# Patient Record
Sex: FEMALE | Race: AMERICAN INDIAN OR ALASKA NATIVE
[De-identification: names, ages, dates, MRNs, and addresses within clinical notes are randomized per-mention and may not be internally consistent; named-entity substitution may affect disease eponyms.]

---

## 2022-07-27 ENCOUNTER — HOSPITAL ENCOUNTER (INPATIENT)
Dept: HOSPITAL 5 - TRG | Age: 27
LOS: 4 days | Discharge: HOME | End: 2022-07-31
Attending: OBSTETRICS & GYNECOLOGY | Admitting: OBSTETRICS & GYNECOLOGY
Payer: MEDICAID

## 2022-07-27 DIAGNOSIS — Z20.822: ICD-10-CM

## 2022-07-27 DIAGNOSIS — A60.00: ICD-10-CM

## 2022-07-27 DIAGNOSIS — Z3A.37: ICD-10-CM

## 2022-07-27 DIAGNOSIS — E66.01: ICD-10-CM

## 2022-07-27 LAB
HCT VFR BLD CALC: 31.8 % (ref 30.3–42.9)
HGB BLD-MCNC: 10 GM/DL (ref 10.1–14.3)
MCHC RBC AUTO-ENTMCNC: 31 % (ref 30–34)
MCV RBC AUTO: 61 FL (ref 79–97)
PLATELET # BLD: 190 K/MM3 (ref 140–440)
RBC # BLD AUTO: 5.21 M/MM3 (ref 3.65–5.03)

## 2022-07-27 PROCEDURE — 86901 BLOOD TYPING SEROLOGIC RH(D): CPT

## 2022-07-27 PROCEDURE — 86850 RBC ANTIBODY SCREEN: CPT

## 2022-07-27 PROCEDURE — 85014 HEMATOCRIT: CPT

## 2022-07-27 PROCEDURE — 86900 BLOOD TYPING SEROLOGIC ABO: CPT

## 2022-07-27 PROCEDURE — 85018 HEMOGLOBIN: CPT

## 2022-07-27 PROCEDURE — 59200 INSERT CERVICAL DILATOR: CPT

## 2022-07-27 PROCEDURE — U0003 INFECTIOUS AGENT DETECTION BY NUCLEIC ACID (DNA OR RNA); SEVERE ACUTE RESPIRATORY SYNDROME CORONAVIRUS 2 (SARS-COV-2) (CORONAVIRUS DISEASE [COVID-19]), AMPLIFIED PROBE TECHNIQUE, MAKING USE OF HIGH THROUGHPUT TECHNOLOGIES AS DESCRIBED BY CMS-2020-01-R: HCPCS

## 2022-07-27 PROCEDURE — 85027 COMPLETE CBC AUTOMATED: CPT

## 2022-07-27 PROCEDURE — 76815 OB US LIMITED FETUS(S): CPT

## 2022-07-27 PROCEDURE — 36415 COLL VENOUS BLD VENIPUNCTURE: CPT

## 2022-07-27 PROCEDURE — 82962 GLUCOSE BLOOD TEST: CPT

## 2022-07-27 RX ADMIN — SODIUM CHLORIDE, SODIUM LACTATE, POTASSIUM CHLORIDE, AND CALCIUM CHLORIDE SCH MLS/HR: .6; .31; .03; .02 INJECTION, SOLUTION INTRAVENOUS at 22:30

## 2022-07-28 RX ADMIN — OXYTOCIN SCH MLS/HR: 10 INJECTION, SOLUTION INTRAMUSCULAR; INTRAVENOUS at 14:08

## 2022-07-28 RX ADMIN — OXYTOCIN SCH MLS/HR: 10 INJECTION, SOLUTION INTRAMUSCULAR; INTRAVENOUS at 15:06

## 2022-07-28 RX ADMIN — OXYTOCIN SCH MLS/HR: 10 INJECTION, SOLUTION INTRAMUSCULAR; INTRAVENOUS at 16:07

## 2022-07-28 NOTE — HISTORY AND PHYSICAL REPORT
History of Present Illness


Date of examination: 22


Date of admission: 


22 20:36





Chief complaint: 


Sent from [facility] for induction





History of present illness: 


This patient is a 26-year-old female  7 para 5-0-1-5 MARIBEL 2022 

at 37 weeks and 1 day who presented from maternal-fetal medicine visit yesterday

with instruction for induction secondary to poorly controlled gestational 

diabetes.  Patient presents with glucose log with multiple values above 200.  

She reports good fetal movement, and denies vaginal bleeding, leakage of fluid 

or contractions.  She has had prenatal care at [facility] since 32 

weeks complicated by gestational diabetes, morbid obesity, iron deficiency 

anemia and genital herpes without lesion or prodrome.  She is GBS negative.





Overnight, the patient received Cervidil for cervical ripening.  She reports bobby

t she ate nothing yesterday. 








Past History


Past Medical History: diabetes (Gestational), hematologic disorders (Anemia), 

other (Morbid obesity)


Past Surgical History: GYN/uterine surgery


GYN History: herpes, trichomonas (Remote from this pregnancy)


Family/Genetic History: none


Social history: no significant social history





- Obstetrical History


Expected Date of Delivery: 22


Actual Gestation: 37 Week(s) 1 Day(s) 


: 7


Para: 5


Hx # Term Pregnancies: 5


Number of  Pregnancies: 0


Spontaneous Abortions: 1


Induced : 0


Number of Living Children: 5





Medications and Allergies


                                    Allergies











Allergy/AdvReac Type Severity Reaction Status Date / Time


 


No Known Allergies Allergy   Verified 20 21:59











                                Home Medications











 Medication  Instructions  Recorded  Confirmed  Last Taken  Type


 


Ferrous Sulfate [Feosol 325 MG tab] 325 mg PO TID 30 Days  tablet 20  

Unknown Rx


 


Ibuprofen [Motrin] 800 mg PO Q8HR PRN 14 Days  tablet 20  Unknown Rx











Active Meds: 


Active Medications





Acetaminophen (Acetaminophen 325 Mg Tab)  650 mg PO Q4H PRN


   PRN Reason: Pain, Mild (1-3)


Butorphanol Tartrate (Butorphanol 2 Mg/1 Ml Inj)  1 mg IV Q2H PRN


   PRN Reason: Pain, Moderate(4-6) LABOR PAIN


Ephedrine Sulfate (Ephedrine Sulfate 50 Mg/1 Ml Inj)  10 mg IV Q2M PRN


   PRN Reason: Hypotension


Lactated Ringer's (Lactated Ringers)  1,000 mls @ 125 mls/hr IV AS DIRECT ZEB


   Last Admin: 22 22:30 Dose:  125 mls/hr


   


Mineral Oil (Mineral Oil 30 Ml Oral Liqd)  30 ml PO QHS PRN


   PRN Reason: Constipation


Terbutaline Sulfate (Terbutaline 1 Mg/1 Ml Inj)  0.25 mg SUB-Q ONCE PRN


   PRN Reason: Hyperstimulation/Hypertonicity











Review of Systems


All systems: negative





- Vital Signs


Vital signs: 


                                   Vital Signs











Pulse Pulse Ox


 


 89   97 


 


 22 19:33  22 19:33








                                        











Temp Pulse Resp BP Pulse Ox


 


 98.2 F   81   16   107/55   99 


 


 22 04:00  22 08:28  22 04:00  22 08:02  22 08:28














- Physical Exam


Breasts: Positive: deferred


Abdomen: Positive: soft (obese, gravid )


Uterus: Positive: enlarged (gravid )


Extremities: Positive: normal





- Obstetrical


FHR: auscultation normal


Uterine Contraction Monitor Mode: External


Cervical Dilatation: 3


Cervical Effacement Percentage: 50


Fetal station: -3


Uterine Contraction Pattern: Irregular


Uterine Tone Measurement Phase: Resting


Uterine Contraction Intensity: Mild





Results


Result Diagrams: 


                                 22 20:45





                              Abnormal lab results











  22 Range/Units





  20:45 07:23 


 


WBC  12.1 H   (4.5-11.0)  K/mm3


 


RBC  5.21 H   (3.65-5.03)  M/mm3


 


Hgb  10.0 L   (10.1-14.3)  gm/dl


 


MCV  61 L   (79-97)  fl


 


MCH  19 L   (28-32)  pg


 


RDW  20.7 H   (13.2-15.2)  %


 


POC Glucose   118 H  ()  mg/dL








All other labs normal.








Assessment and Plan





A: IUP at 37w1d


    Gestational diabetes, poorly controlled


    Morbid obesity


    Anemia


    Limited prenatal care


    Genital herpes without lesion or prodrome


    GBS negative








P:Admit to labor and delivery


   Clear liquid diet


   Accu-Chek every 4 hours in latent labor every hour in active labor


   Valtrex for herpes suppression


   Closely monitor maternal and fetal status

## 2022-07-29 NOTE — PROGRESS NOTE
Assessment and Plan





- Patient Problems


(1) Gestational diabetes


Current Visit: Yes   Status: Acute   


Plan to address problem: 


Continue current management plan








Subjective





- Subjective


Date of service: 07/29/22


Interval history: 


The patient is currently undergoing induction for gestational diabetes.  She is 

receiving Pitocin augmentation.  Currently without any significant complaints.





Patient reports: no new complaints





Objective





- Vital Signs


Vital Signs: 


                               Vital Signs - 12hr











  07/28/22 07/28/22 07/28/22





  20:02 20:07 20:12


 


Temperature   


 


Pulse Rate 77 81 81


 


Blood Pressure   


 


O2 Sat by Pulse 98 98 98





Oximetry   


 


O2 Sat by Pulse   





Oximetry [   





Throughout]   














  07/28/22 07/28/22 07/28/22





  20:17 20:22 20:27


 


Temperature   


 


Pulse Rate 81 82 85


 


Blood Pressure   


 


O2 Sat by Pulse 98 97 98





Oximetry   


 


O2 Sat by Pulse   





Oximetry [   





Throughout]   














  07/28/22 07/28/22 07/28/22





  20:32 20:37 20:42


 


Temperature   


 


Pulse Rate 94 H 83 83


 


Blood Pressure   


 


O2 Sat by Pulse 98 99 98





Oximetry   


 


O2 Sat by Pulse   





Oximetry [   





Throughout]   














  07/28/22 07/28/22 07/28/22





  20:46 20:47 20:52


 


Temperature 98.6 F  


 


Pulse Rate  82 83


 


Blood Pressure   


 


O2 Sat by Pulse  97 97





Oximetry   


 


O2 Sat by Pulse   





Oximetry [   





Throughout]   














  07/28/22 07/28/22 07/28/22





  20:57 21:02 21:07


 


Temperature   


 


Pulse Rate 83 80 84


 


Blood Pressure   


 


O2 Sat by Pulse 98 99 98





Oximetry   


 


O2 Sat by Pulse   





Oximetry [   





Throughout]   














  07/28/22 07/28/22 07/28/22





  21:12 21:17 21:22


 


Temperature   


 


Pulse Rate 85 80 81


 


Blood Pressure   


 


O2 Sat by Pulse 98 98 99





Oximetry   


 


O2 Sat by Pulse  95 





Oximetry [   





Throughout]   














  07/28/22 07/28/22 07/28/22





  21:27 21:32 21:37


 


Temperature   


 


Pulse Rate 84 81 79


 


Blood Pressure   


 


O2 Sat by Pulse 97 97 98





Oximetry   


 


O2 Sat by Pulse   





Oximetry [   





Throughout]   














  07/28/22 07/28/22 07/28/22





  21:42 22:29 22:34


 


Temperature   


 


Pulse Rate 86 89 80


 


Blood Pressure   


 


O2 Sat by Pulse 98 98 98





Oximetry   


 


O2 Sat by Pulse   





Oximetry [   





Throughout]   














  07/28/22 07/28/22 07/28/22





  22:39 22:44 22:49


 


Temperature   


 


Pulse Rate 84 79 79


 


Blood Pressure   


 


O2 Sat by Pulse 98 98 98





Oximetry   


 


O2 Sat by Pulse   





Oximetry [   





Throughout]   














  07/28/22 07/28/22 07/28/22





  22:54 22:59 23:04


 


Temperature   


 


Pulse Rate 80 81 79


 


Blood Pressure   


 


O2 Sat by Pulse 98 98 98





Oximetry   


 


O2 Sat by Pulse   





Oximetry [   





Throughout]   














  07/28/22 07/28/22 07/28/22





  23:09 23:14 23:19


 


Temperature   


 


Pulse Rate 78 79 78


 


Blood Pressure   


 


O2 Sat by Pulse 98 98 98





Oximetry   


 


O2 Sat by Pulse   





Oximetry [   





Throughout]   














  07/28/22 07/28/22 07/28/22





  23:24 23:29 23:34


 


Temperature   


 


Pulse Rate 77 79 81


 


Blood Pressure   


 


O2 Sat by Pulse 98 98 97





Oximetry   


 


O2 Sat by Pulse   





Oximetry [   





Throughout]   














  07/28/22 07/28/22 07/28/22





  23:39 23:44 23:49


 


Temperature   


 


Pulse Rate 78 75 77


 


Blood Pressure   


 


O2 Sat by Pulse 97 98 97





Oximetry   


 


O2 Sat by Pulse   





Oximetry [   





Throughout]   














  07/28/22 07/28/22 07/29/22





  23:54 23:59 00:04


 


Temperature   


 


Pulse Rate 74 71 80


 


Blood Pressure   


 


O2 Sat by Pulse 96 96 96





Oximetry   


 


O2 Sat by Pulse   





Oximetry [   





Throughout]   














  07/29/22 07/29/22 07/29/22





  00:09 00:11 00:14


 


Temperature   


 


Pulse Rate 79 84 81


 


Blood Pressure   


 


O2 Sat by Pulse 95 94 96





Oximetry   


 


O2 Sat by Pulse   





Oximetry [   





Throughout]   














  07/29/22 07/29/22 07/29/22





  00:16 00:19 00:21


 


Temperature   


 


Pulse Rate 80 82 80


 


Blood Pressure   


 


O2 Sat by Pulse 92 95 93





Oximetry   


 


O2 Sat by Pulse   





Oximetry [   





Throughout]   














  07/29/22 07/29/22 07/29/22





  00:24 00:29 00:34


 


Temperature   


 


Pulse Rate 83 75 85


 


Blood Pressure   


 


O2 Sat by Pulse 96 92 96





Oximetry   


 


O2 Sat by Pulse   





Oximetry [   





Throughout]   














  07/29/22 07/29/22 07/29/22





  00:39 00:44 00:49


 


Temperature   


 


Pulse Rate 83 78 76


 


Blood Pressure   


 


O2 Sat by Pulse 96 97 98





Oximetry   


 


O2 Sat by Pulse   





Oximetry [   





Throughout]   














  07/29/22 07/29/22 07/29/22





  00:54 00:59 01:04


 


Temperature   


 


Pulse Rate 75 81 76


 


Blood Pressure   


 


O2 Sat by Pulse 97 97 97





Oximetry   


 


O2 Sat by Pulse   





Oximetry [   





Throughout]   














  07/29/22 07/29/22 07/29/22





  01:09 01:14 01:19


 


Temperature   


 


Pulse Rate 78 77 79


 


Blood Pressure   


 


O2 Sat by Pulse 98 97 97





Oximetry   


 


O2 Sat by Pulse   





Oximetry [   





Throughout]   














  07/29/22 07/29/22 07/29/22





  01:24 02:30 02:35


 


Temperature   


 


Pulse Rate 86 93 H 76


 


Blood Pressure   


 


O2 Sat by Pulse 96 98 97





Oximetry   


 


O2 Sat by Pulse   





Oximetry [   





Throughout]   














  07/29/22 07/29/22 07/29/22





  02:40 02:45 02:50


 


Temperature   


 


Pulse Rate 79 74 72


 


Blood Pressure   


 


O2 Sat by Pulse 97 97 97





Oximetry   


 


O2 Sat by Pulse   





Oximetry [   





Throughout]   














  07/29/22 07/29/22 07/29/22





  02:55 03:00 03:05


 


Temperature   


 


Pulse Rate 75 75 77


 


Blood Pressure   


 


O2 Sat by Pulse 96 96 96





Oximetry   


 


O2 Sat by Pulse   





Oximetry [   





Throughout]   














  07/29/22 07/29/22 07/29/22





  03:10 03:15 03:16


 


Temperature   


 


Pulse Rate 76 84 79


 


Blood Pressure   


 


O2 Sat by Pulse 97 96 94





Oximetry   


 


O2 Sat by Pulse   





Oximetry [   





Throughout]   














  07/29/22 07/29/22 07/29/22





  03:20 03:22 03:25


 


Temperature   


 


Pulse Rate 75 81 80


 


Blood Pressure   


 


O2 Sat by Pulse 97 94 96





Oximetry   


 


O2 Sat by Pulse   





Oximetry [   





Throughout]   














  07/29/22 07/29/22 07/29/22





  03:28 03:30 03:33


 


Temperature   


 


Pulse Rate 75 82 76


 


Blood Pressure   


 


O2 Sat by Pulse 92 98 94





Oximetry   


 


O2 Sat by Pulse   





Oximetry [   





Throughout]   














  07/29/22 07/29/22 07/29/22





  03:35 03:40 03:42


 


Temperature   


 


Pulse Rate 75 82 82


 


Blood Pressure   


 


O2 Sat by Pulse 93 96 94





Oximetry   


 


O2 Sat by Pulse   





Oximetry [   





Throughout]   














  07/29/22 07/29/22 07/29/22





  03:45 03:50 03:53


 


Temperature   


 


Pulse Rate 76 80 75


 


Blood Pressure   


 


O2 Sat by Pulse 95 96 93





Oximetry   


 


O2 Sat by Pulse   





Oximetry [   





Throughout]   














  07/29/22 07/29/22 07/29/22





  03:55 03:58 04:00


 


Temperature   


 


Pulse Rate 80 74 77


 


Blood Pressure   


 


O2 Sat by Pulse 98 94 98





Oximetry   


 


O2 Sat by Pulse   





Oximetry [   





Throughout]   














  07/29/22 07/29/22 07/29/22





  04:05 04:10 04:15


 


Temperature   


 


Pulse Rate 78 78 76


 


Blood Pressure   


 


O2 Sat by Pulse 97 97 97





Oximetry   


 


O2 Sat by Pulse   





Oximetry [   





Throughout]   














  07/29/22 07/29/22 07/29/22





  04:20 04:25 04:30


 


Temperature   


 


Pulse Rate 76 77 77


 


Blood Pressure   


 


O2 Sat by Pulse 98 98 97





Oximetry   


 


O2 Sat by Pulse   





Oximetry [   





Throughout]   














  07/29/22 07/29/22 07/29/22





  04:35 04:40 04:45


 


Temperature   


 


Pulse Rate 78 76 74


 


Blood Pressure   


 


O2 Sat by Pulse 97 98 98





Oximetry   


 


O2 Sat by Pulse   





Oximetry [   





Throughout]   














  07/29/22 07/29/22 07/29/22





  04:50 04:53 04:55


 


Temperature   


 


Pulse Rate 76 96 H 72


 


Blood Pressure   


 


O2 Sat by Pulse 98 94 98





Oximetry   


 


O2 Sat by Pulse   





Oximetry [   





Throughout]   














  07/29/22 07/29/22 07/29/22





  05:00 05:01 05:05


 


Temperature   


 


Pulse Rate 76 72 76


 


Blood Pressure   


 


O2 Sat by Pulse 95 94 94





Oximetry   


 


O2 Sat by Pulse   





Oximetry [   





Throughout]   














  07/29/22 07/29/22 07/29/22





  05:06 05:09 05:10


 


Temperature   


 


Pulse Rate 88 78 78


 


Blood Pressure  104/55 


 


O2 Sat by Pulse 94  97





Oximetry   


 


O2 Sat by Pulse   





Oximetry [   





Throughout]   














  07/29/22 07/29/22 07/29/22





  05:15 05:19 05:20


 


Temperature  98.1 F 


 


Pulse Rate 78  77


 


Blood Pressure   


 


O2 Sat by Pulse 97  96





Oximetry   


 


O2 Sat by Pulse   





Oximetry [   





Throughout]   














  07/29/22 07/29/22 07/29/22





  05:25 05:30 05:35


 


Temperature   


 


Pulse Rate 75 77 75


 


Blood Pressure   


 


O2 Sat by Pulse 97 97 97





Oximetry   


 


O2 Sat by Pulse   





Oximetry [   





Throughout]   














  07/29/22 07/29/22 07/29/22





  05:39 05:40 05:45


 


Temperature   


 


Pulse Rate 81 83 75


 


Blood Pressure   


 


O2 Sat by Pulse 94 96 99





Oximetry   


 


O2 Sat by Pulse   





Oximetry [   





Throughout]   














  07/29/22 07/29/22 07/29/22





  05:50 05:55 06:00


 


Temperature   


 


Pulse Rate 83 77 81


 


Blood Pressure   


 


O2 Sat by Pulse 97 98 99





Oximetry   


 


O2 Sat by Pulse   





Oximetry [   





Throughout]   














  07/29/22 07/29/22 07/29/22





  06:05 06:10 06:15


 


Temperature   


 


Pulse Rate 79 75 75


 


Blood Pressure   


 


O2 Sat by Pulse 99 99 98





Oximetry   


 


O2 Sat by Pulse   





Oximetry [   





Throughout]   














  07/29/22 07/29/22 07/29/22





  06:20 06:25 06:30


 


Temperature   


 


Pulse Rate 77 78 78


 


Blood Pressure   


 


O2 Sat by Pulse 98 98 98





Oximetry   


 


O2 Sat by Pulse   





Oximetry [   





Throughout]   














  07/29/22 07/29/22 07/29/22





  06:35 06:40 06:45


 


Temperature   


 


Pulse Rate 74 79 78


 


Blood Pressure   


 


O2 Sat by Pulse 98 98 99





Oximetry   


 


O2 Sat by Pulse   





Oximetry [   





Throughout]   














  07/29/22 07/29/22 07/29/22





  06:50 06:55 07:00


 


Temperature   


 


Pulse Rate 77 80 86


 


Blood Pressure   


 


O2 Sat by Pulse 98 99 98





Oximetry   


 


O2 Sat by Pulse   





Oximetry [   





Throughout]   














  07/29/22 07/29/22 07/29/22





  07:05 07:10 07:15


 


Temperature   


 


Pulse Rate 86 83 81


 


Blood Pressure   


 


O2 Sat by Pulse 98 98 99





Oximetry   


 


O2 Sat by Pulse   





Oximetry [   





Throughout]   














  07/29/22 07/29/22 07/29/22





  07:20 07:25 07:30


 


Temperature   


 


Pulse Rate 80 77 84


 


Blood Pressure   97/49


 


O2 Sat by Pulse 98 98 97





Oximetry   


 


O2 Sat by Pulse   





Oximetry [   





Throughout]   














  07/29/22 07/29/22





  07:35 07:40


 


Temperature  


 


Pulse Rate 81 86


 


Blood Pressure  


 


O2 Sat by Pulse 98 98





Oximetry  


 


O2 Sat by Pulse  





Oximetry [  





Throughout]  














- Labs


Labs: 


                                  Abnormal Labs











  07/27/22 07/28/22 07/28/22





  20:45 07:23 17:11


 


WBC  12.1 H  


 


RBC  5.21 H  


 


Hgb  10.0 L  


 


MCV  61 L  


 


MCH  19 L  


 


RDW  20.7 H  


 


POC Glucose   118 H  68 L








                         Laboratory Results - last 24 hr











  07/28/22 07/28/22 07/28/22





  12:15 12:43 17:11


 


POC Glucose   87  68 L


 


SARS-CoV-2 (PCR)  Negative  














  07/28/22 07/29/22 07/29/22





  21:05 01:25 05:07


 


POC Glucose  90  93  84


 


SARS-CoV-2 (PCR)

## 2022-07-29 NOTE — ULTRASOUND REPORT
ULTRASOUND OBSTETRIC LIMITED 



INDICATION / CLINICAL INFORMATION: presentation.

Clinical Gestational Age (GA) in weeks, days: 37, 2 



TECHNIQUE: Transabdominal.



COMPARISON: None available.



FINDINGS:



FETAL HEART RATE (beats per minute): 123 





PRESENTATION: Cephalic. 



ADDITIONAL FINDINGS: None.



IMPRESSION:

1. No significant abnormality.



Signer Name: Vega Zaidi DO 

Signed: 7/29/2022 5:03 PM

Workstation Name: [id number]

## 2022-07-30 PROCEDURE — 00HU33Z INSERTION OF INFUSION DEVICE INTO SPINAL CANAL, PERCUTANEOUS APPROACH: ICD-10-PCS | Performed by: OBSTETRICS & GYNECOLOGY

## 2022-07-30 PROCEDURE — 3E0R3BZ INTRODUCTION OF ANESTHETIC AGENT INTO SPINAL CANAL, PERCUTANEOUS APPROACH: ICD-10-PCS | Performed by: OBSTETRICS & GYNECOLOGY

## 2022-07-30 RX ADMIN — IBUPROFEN SCH MG: 800 TABLET, FILM COATED ORAL at 20:06

## 2022-07-30 RX ADMIN — IBUPROFEN SCH MG: 800 TABLET, FILM COATED ORAL at 13:19

## 2022-07-30 RX ADMIN — OXYTOCIN SCH MLS/HR: 10 INJECTION, SOLUTION INTRAMUSCULAR; INTRAVENOUS at 10:32

## 2022-07-30 RX ADMIN — SODIUM CHLORIDE, SODIUM LACTATE, POTASSIUM CHLORIDE, AND CALCIUM CHLORIDE SCH MLS/HR: .6; .31; .03; .02 INJECTION, SOLUTION INTRAVENOUS at 10:31

## 2022-07-30 NOTE — ANESTHESIA DAY OF SURGERY
Anesthesia Day of Surgery





- Day of Surgery


Patient Examined: Yes


Patient H&P Reviewed: Yes


Patient is NPO: Yes


Beta Blockers: No


Cardiac Clearance: No


Pulmonary Clearance: No


Everardo's Test: N/A

## 2022-07-30 NOTE — ANESTHESIA CONSULTATION
Anesthesia Consult and Med Hx


Date of service: 07/30/22





- Airway


Anesthetic Teeth Evaluation: Good


ROM Head & Neck: Adequate


Mallampati Class: Class II


Intubation Access Assessment: Probably Good





- Pulmonary Exam


CTA: Yes





- Cardiac Exam


Cardiac Exam: RRR





- Pre-Operative Health Status


ASA Pre-Surgery Classification: ASA2


Proposed Anesthetic Plan: Epidural





- Pulmonary


Hx Smoking: No


Hx Asthma: No


Hx Respiratory Symptoms: No


SOB: No


COPD: No


Home Oxygen Therapy: No


Hx Pneumonia: No


Hx Sleep Apnea: No





- Cardiovascular System


Hx Hypertension: No


Hx Coronary Artery Disease: No


Hx Heart Attack/AMI: No


Hx Angina: No


Hx Percutaneous Transluminal Coronary Angioplasty (PTCA): No


Hx Cardia Arrhythmia: No


Hx Pacemaker: No


Hx Internal Defibrillator: No


Hx Valvular Heart Disease: No


Hx Heart Murmur: No


Hx Peripheral Vascular Disease: No





- Central Nervous System


Hx Neuromuscular Disorder: No


Hx Seizures: No


CVA: No


Hx Back Pain: No


Hx Psychiatric Problems: No





- Gastrointestinal


Hx Ulcer: No


Hx Gastroesophageal Reflux Disease: No





- Endocrine


Hx Renal Disease: No


Hx End Stage Renal Disease: No


Hx Cirrhosis: No


Hx Liver Disease: No


Hx Insulin Dependent Diabetes: No


Hx Non-Insulin Dependent Diabetes: No


Hx Thyroid Disease: No


Hx Hypothyroidism: No


Hx Hyperthyroidism: No





- Hematic


Hx Anemia: No


Hx Sickle Cell Disease: No





- Other Systems


Hx Alcohol Use: No


Hx Substance Use: No


Hx Cancer: No


Hx Obesity: Yes

## 2022-07-30 NOTE — EVENT NOTE
Date: 07/30/22





Patient undergoing induction for gestational diabetes.  Amniotomy was performed 

with clear fluid.  IUPC was placed.  Patient is currently on Pitocin 20 mIU.  

Cervix 3 cm. will continue with augmentation

## 2022-07-30 NOTE — PROGRESS NOTE
Labor Epidural





- Labor Epidural


Start Time: 09:51


Stop Time: 09:56


Performed by:: DEANDRE SINGH


Procedure: 





Epidural Requested for Labor Pain. H&P and PT Chart reviewed and consent 

obtained. Time out performed and the procedure was explained, all questions 

answered.  Patient was placed in a sitting position with monitors applied. The 

PTs back was prepped and draped in usual sterile fashion. The Skin was 

localized with 3 mL of 1% lidocaine at L3-L4.  A 17-gauge Touhy epidural needle 

was advanced to SARIKA with saline at 7 cm and no blood/CSF was noted via epidural 

needle.  Epidural catheter was advanced to 12 cm. There was negative aspiration 

for blood and CSF in the catheter and negative response to a test dose of 3 ml 

1.5% lidocaine w/ Epi and a sterile dressing was applied Patient tolerated the 

procedure well and there were no immediate complications noted.

## 2022-07-30 NOTE — PROCEDURE NOTE
OB Delivery Note





- Delivery


Date of Delivery: 22


Surgeon: FABRICIO WELLS


Estimated blood loss: 300cc





- Vaginal


Delivery presentation: vertex


Delivery position: OA


Delivery monitor: external FHT, external uterine, internal uterine


Route of delivery: 


Delivery placenta: spontaneous


Delivery cord: nuchal cord, 3 umbilical vessels


Delivery laceration: none


Anesthesia: epidural


Delivery comments: 





The patient had a spontaneous delivery of a liveborn male infant with Apgars of 

8 and 9 weight 7 pounds 8 ounces.  After delivery of the head and nuchal cord x1

was manually reduced.  The shoulders delivered without difficulty.  The cord was

clamped and cut and the infant was placed on the warmer for further evaluation. 

The placenta delivered spontaneously intact with a three-vessel cord.  No 

lacerations were noted.  The patient experience uterine atony after delivery of 

the placenta.  Cytotec was given per rectum.  EBL of 400 mL





- Infant


  ** A


Apgar at 1 minute: 8


Apgar at 5 minutes: 9


Infant Gender: Male (Weight 7 pounds 8 ounces)

## 2022-07-31 VITALS — DIASTOLIC BLOOD PRESSURE: 77 MMHG | SYSTOLIC BLOOD PRESSURE: 133 MMHG

## 2022-07-31 LAB
HCT VFR BLD CALC: 29.2 % (ref 30.3–42.9)
HGB BLD-MCNC: 8.9 GM/DL (ref 10.1–14.3)

## 2022-07-31 RX ADMIN — IBUPROFEN SCH MG: 800 TABLET, FILM COATED ORAL at 05:11

## 2022-07-31 NOTE — PROGRESS NOTE
Assessment and Plan





- Patient Problems


(1) Gestational diabetes


Current Visit: Yes   Status: Acute   


Plan to address problem: 


Patient doing well


Discharge home








Subjective





- Subjective


Date of service: 22


Interval history: 


The patient is currently without complaints.  The infant is currently in the 

NICU.  The patient has requested to go home today.


Patient reports: appetite normal, voiding normally, pain well controlled


[place]: in NICU





Objective





- Vital Signs


Latest vital signs: 


                                   Vital Signs











  Temp Pulse Resp BP Pulse Ox Pulse Ox


 


 22 08:42       98


 


 22 08:25  97.8 F  69  20  133/77  99 


 


 22 00:50      95 


 


 22 00:48  98.3 F  80  20  120/57  89 


 


 22 21:56  98.5 F  79  20  142/85  98 


 


 22 20:00       98


 


 22 15:47  98.8 F  73  18  130/64  97 


 


 22 14:40   75   151/65  


 


 22 14:39   76    98 


 


 22 14:34   76    99 


 


 22 14:29   93 H    98 


 


 22 14:24   76   110/56  99 


 


 22 14:19   72    99 


 


 22 14:14   80    98 


 


 22 14:09   73   125/58  98 


 


 22 14:04   74    98 


 


 22 13:59   74    99 


 


 22 13:54   76   149/66  99 


 


 22 13:49   73    99 


 


 22 13:47       98


 


 22 13:44   77    99 


 


 22 13:43  98.1 F   18   


 


 22 13:39   74   146/74  99 


 


 22 13:34   79    100 


 


 22 13:29   74    100 


 


 22 13:24   82   155/77  100 


 


 22 13:19   77    100 


 


 22 13:14   88    100 


 


 22 13:10   75   151/72  


 


 22 13:09   77    100 


 


 22 13:04   89    100 


 


 22 12:59   77    99 


 


 22 12:54   84    99 


 


 22 12:49   79    100 


 


 22 12:44   76    99 


 


 22 12:40   82   142/69  


 


 22 12:39   83    100 


 


 22 12:34   79    100 


 


 22 12:29   84    100 


 


 22 12:25   90   119/58  


 


 22 12:24   90    100 


 


 22 12:19   88    99 


 


 22 12:18   90   134/64  


 


 22 12:14   71    100 


 


 22 12:10   110 H   123/69  


 


 22 12:09   107 H    100 


 


 22 12:04   115 H    100 


 


 22 11:59   136 H    100 


 


 22 11:56   131 H   150/76  


 


 22 11:54   117 H    97 


 


 22 11:49   104 H    99 


 


 22 11:44   140 H    100 


 


 22 11:40   137 H   204/84  


 


 22 11:39   137 H    100 


 


 22 11:34   107 H    100 


 


 22 11:29   98 H    100 


 


 22 11:24   80   126/57  100 


 


 22 11:19   87    100 


 


 22 11:14   82    100 


 


 22 11:09   90   136/58  100 


 


 22 11:07   87   137/69  


 


 22 11:04   85   139/70  100 


 


 22 11:00   99 H   138/79  


 


 22 10:59   97 H    100 


 


 22 10:56   88   144/76  


 


 22 10:55   88    81 L 


 


 22 10:54   102 H   148/77  98 


 


 22 10:49   92 H    99 


 


 22 10:44   87    98 


 


 22 10:39   80    100 


 


 22 10:34   81    100 


 


 22 10:29   80    100 


 


 22 10:24   86    100 


 


 22 10:19   85    100 


 


 22 10:14   78    100 


 


 22 10:09   77    100 








                                Intake and Output











 22





 22:59 06:59 14:59


 


Intake Total 200 600 


 


Output Total 800  


 


Balance -600 600 


 


Intake:   


 


  Oral 200 240 


 


  Intake, [place]  360 


 


Output:   


 


  Urine 800  


 


    Void 800  


 


Other:   


 


  Total, Intake Amount 200 240 


 


  Total, Output Amount 800  


 


  # Voids   


 


    Void 3 1 














- Exam


Uterus: Present: firm





- Labs


Labs: 


                              Abnormal lab results











  22 Range/Units





  04:40 


 


Hgb  8.9 L  (10.1-14.3)  gm/dl


 


Hct  29.2 L  (30.3-42.9)  %

## 2022-07-31 NOTE — DISCHARGE SUMMARY
Providers





- Providers


Date of Admission: 


22 20:36





Date of discharge: 22


Attending physician: 


FABRICIO WELLS





Primary care physician: 


FABRICIO WELLS








Hospitalization


Reason for admission: induction of labor


Delivery: 


Discharge diagnosis: IUP at term delivered


Hospital course: 





The patient was admitted for induction of labor secondary to gestational 

diabetes.  She had a successful vaginal delivery.  Her postpartum course was 

uneventful.


Condition at discharge: Good


Disposition: 01 HOME / SELF CARE / HOMELESS





- Discharge Diagnoses


(1) Gestational diabetes


Status: Acute   





Plan





- Discharge Medications


Prescriptions: 


Ibuprofen [Motrin] 800 mg PO Q8HR PRN #30 tablet


 PRN Reason: Pain , Severe (7-10)


HYDROcodone/APAP 5-325 [Nye 5/325] 1 each PO Q6HR PRN #15 tablet


 PRN Reason: Pain





- Provider Discharge Summary


Activity: no sex for 6 weeks, no heavy lifting 4 weeks, no strenuous exercise


Diet: routine


Instructions: routine


Additional instructions: 


[]  Smoking cessation referral if applicable(refer to patient education folder 

for contact #)


[]  Refer to [facility] Booklet








Call your doctor immediately for:


* Fever > 100.5


* Heavy vaginal bleeding ( >1 pad per hour)


* Severe persistent headache


* Shortness of breath


* Reddened, hot, painful area to leg or breast


* Schedule postpartum visit in 4 weeks








- Follow up plan

## 2022-08-31 ENCOUNTER — HOSPITAL ENCOUNTER (OUTPATIENT)
Dept: HOSPITAL 5 - OR | Age: 27
Discharge: HOME | End: 2022-08-31
Attending: OBSTETRICS & GYNECOLOGY
Payer: MEDICAID

## 2022-08-31 VITALS — DIASTOLIC BLOOD PRESSURE: 74 MMHG | SYSTOLIC BLOOD PRESSURE: 128 MMHG

## 2022-08-31 DIAGNOSIS — Z30.2: Primary | ICD-10-CM

## 2022-08-31 DIAGNOSIS — E66.9: ICD-10-CM

## 2022-08-31 DIAGNOSIS — Z98.890: ICD-10-CM

## 2022-08-31 DIAGNOSIS — Z79.899: ICD-10-CM

## 2022-08-31 PROCEDURE — 58670 LAPAROSCOPY TUBAL CAUTERY: CPT

## 2022-08-31 PROCEDURE — 81025 URINE PREGNANCY TEST: CPT

## 2022-08-31 PROCEDURE — 88302 TISSUE EXAM BY PATHOLOGIST: CPT

## 2022-08-31 NOTE — OPERATIVE REPORT
Operative Report


Operative Report: 





Date of surgery: 2022


 


Preoperative diagnosis: Unwanted fertility


 


Postoperative diagnosis: Same as above


 


Procedure: Laparoscopy; left salpingectomy


 


Surgeon: Angela Bello M.D.


 


Anesthesia: General endotracheal anesthesia


 


Estimated blood loss: Minimal





Pathology: Left fallopian tubes





Findings: Normal uterus and ovaries bilaterally.  The patient had a surgically 

absent right fallopian tube.  Her left fallopian tube was normal in appearance.


 


Indication: 27-year-old -0-1-6 with a history of an ectopic pregnancy in 

the past.  The patient has undesired fertility and has elected to undergo 

permanent sterilization.


 


Procedure: The patient was taken to the operating room and given general 

endotracheal anesthesia without complication.  The patient is prepped and draped

in a normal sterile fashion.  A bivalve speculum was placed in the patient's 

vagina and a single-tooth tenaculum was placed on  the anterior lip of the 

cervix .A uterine acorn manipulator was placed,  and the bivalve speculum was 

then removed. Attention was then turned to the patient's abdomen where a 5 mm 

infraumbilical skin incision was then made.  A Veress needle was placed and 

peritoneal entry was verified water-filled syringe.  Insufflation of the 

peritoneal cavity was performed with CO2 gas.  A 5 mm trocar was placed and the 

laparoscope was then inserted.  The patient was then placed in Trendelenburg.  A

7 mm suprapubic skin incision was then made.  Under direct visualization a 7 mm 

trocar was then placed.  An additional 5 mm left lateral trocar was also placed.

 General survey of the patient's abdomen revealed evidence of a surgically 

absent right fallopian tube normal left fallopian tube.  The left fallopian tube

was then followed out to the fimbriated end.  The LigaSure device was used in 

order to coagulate and transect the mesosalpinx.  The fallopian tube was excised

from the adnexa.  The fallopian tube was removed through the 7 mm trocar.  A 

complete survey of the right adnexa was performed to the verify that the right 

fallopian tube was surgically absent.  There is increased vascularity going to 

the uterus and was adherent to the right pelvic sidewall.  The trocars were then

removed.  The pneumoperitoneum was then released.  The 5 mm trocar laparoscope 

was then removed.  The skin incisions were then closed with 4-0 Monocryl.  The 

incisions were injected with quarter percent Marcaine.  Dressings were applied 

to the incision.  The vaginal instruments were then removed atraumatically.  

Then successfully extubated and taken to the recovery room.  All sponge laps and

needle counts were correct x2.

## 2022-08-31 NOTE — SHORT STAY SUMMARY
Short Stay Documentation


Date of service: 22


Narrative H&P: 





27-year-old  with undesired fertility.  The patient is aware of other 

contraceptive options and has elected for permanent sterilization.





- History


Principal diagnosis: Unwanted fertility


Past Medical History: No medical history, other (Ectopic pregnancy)


Past Surgical History: Other (Laparoscopy and salpingectomy)


Social history: single





- Allergies and Medications


Current Medications: 


                                    Allergies





No Known Allergies Allergy (Verified 22 21:31)


   





                                Home Medications











 Medication  Instructions  Recorded  Confirmed  Last Taken  Type


 


No Known Home Medications [No  22 Unknown History





Reported Home Medications]     








Active Medications





Acetaminophen (Acetaminophen 500 Mg Tab)  1,000 mg PO PREOP ZEB


   Stop: 22 23:59


Celecoxib (Celecoxib 200 Mg Cap)  200 mg PO PREOP NR


   Stop: 22 23:59


Gabapentin (Gabapentin 300 Mg Cap)  300 mg PO PREOP NR


   Stop: 22 23:59


Lactated Ringer's (Lactated Ringers)  1,000 mls @ 100 mls/hr IV AS DIRECT ZEB


   Stop: 22 23:59


Midazolam HCl (Midazolam 2 Mg/2 Ml Inj)  2 mg IV PREOP NR


   Stop: 22 23:59


Scopolamine (Scopolamine Transdermal Patch 72 Hr)  1 each TD PREOP NR


   Stop: 22 23:59











- Physical exam


General appearance: no acute distress


Integumentary: no rash


HEENT: Atraumatic


Lungs: Clear to auscultation


Breasts: deferred


Heart: Regular rate


Gastrointestinal: normal


Female Genitourinary: deferred


Rectal Exam: deferred





- Brief post op/procedure progress note


Date of procedure: 22


Pre-op diagnosis: Undesired fertility


Post-op diagnosis: same


Procedure: 





Laparoscopy


Left salpingectomy


Anesthesia: GETA


Surgeon: FABRICIO WELLS


Estimated blood loss: minimal


Pathology: list (Left fallopian tube)


Specimen disposition: to lab


Condition: stable





- Hospital course


Hospital course: 





The patient was admitted the day of surgery underwent a laparoscopic tubal 

ligation.  Please see operative note for details of surgery.  Postoperative 

course was uneventful.





- Disposition


Condition at discharge: Good


Disposition: 01 HOME / SELF CARE / HOMELESS





Short Stay Discharge Plan


Activity: no restrictions (Pelvic rest for 1 week)


Diet: regular


Additional Instructions: 


Follow-up is not required


Follow-up as needed


Prescriptions: 


Ibuprofen [Motrin] 800 mg PO Q8HR PRN #30 tablet


 PRN Reason: Pain , Severe (7-10)


HYDROcodone/APAP 5-325 [Ralph 5/325] 1 each PO Q6HR PRN #15 tablet


 PRN Reason: Pain

## 2022-08-31 NOTE — ANESTHESIA CONSULTATION
Anesthesia Consult and Med Hx


Date of service: 08/31/22





- Airway


Anesthetic Teeth Evaluation: Good


ROM Head & Neck: Adequate


Mental/Hyoid Distance: Adequate


Mallampati Class: Class II


Intubation Access Assessment: Probably Good





- Pre-Operative Health Status


ASA Pre-Surgery Classification: ASA2


Proposed Anesthetic Plan: General





- Pulmonary


Hx Smoking: No


Hx Respiratory Symptoms: No





- Cardiovascular System


Hx Hypertension: No





- Central Nervous System


CVA: No





- Gastrointestinal


Hx Ulcer: No





- Endocrine


Hx Renal Disease: No


Hx Liver Disease: No


Hx Insulin Dependent Diabetes: No


Hx Non-Insulin Dependent Diabetes: No


Hx Thyroid Disease: No





- Other Systems


Hx Obesity: Yes (BMI 40)





- Additional Comments


Anesthesia Medical History Comments: No hx anesthetic complications.